# Patient Record
Sex: MALE | Race: WHITE | Employment: OTHER | URBAN - METROPOLITAN AREA
[De-identification: names, ages, dates, MRNs, and addresses within clinical notes are randomized per-mention and may not be internally consistent; named-entity substitution may affect disease eponyms.]

---

## 2022-11-19 ENCOUNTER — HOSPITAL ENCOUNTER (EMERGENCY)
Age: 72
Discharge: ANOTHER HOSPITAL | End: 2022-11-20
Attending: EMERGENCY MEDICINE
Payer: OTHER GOVERNMENT

## 2022-11-19 DIAGNOSIS — R00.0 TACHYCARDIA: ICD-10-CM

## 2022-11-19 DIAGNOSIS — I77.6 VASCULITIS (HCC): Primary | ICD-10-CM

## 2022-11-19 DIAGNOSIS — R09.02 HYPOXIA: ICD-10-CM

## 2022-11-19 DIAGNOSIS — R31.9 HEMATURIA, UNSPECIFIED TYPE: ICD-10-CM

## 2022-11-19 LAB
ALBUMIN SERPL-MCNC: 3.5 GM/DL (ref 3.4–5)
ALP BLD-CCNC: 59 IU/L (ref 40–129)
ALT SERPL-CCNC: 18 U/L (ref 10–40)
ANION GAP SERPL CALCULATED.3IONS-SCNC: 13 MMOL/L (ref 4–16)
APTT: 32.9 SECONDS (ref 25.1–37.1)
AST SERPL-CCNC: 12 IU/L (ref 15–37)
BACTERIA: NEGATIVE /HPF
BASOPHILS ABSOLUTE: 0 K/CU MM
BASOPHILS RELATIVE PERCENT: 0.2 % (ref 0–1)
BILIRUB SERPL-MCNC: 0.5 MG/DL (ref 0–1)
BILIRUBIN URINE: NEGATIVE MG/DL
BLOOD, URINE: ABNORMAL
BUN BLDV-MCNC: 17 MG/DL (ref 6–23)
CALCIUM SERPL-MCNC: 9.4 MG/DL (ref 8.3–10.6)
CAST TYPE: ABNORMAL /HPF
CHLORIDE BLD-SCNC: 95 MMOL/L (ref 99–110)
CLARITY: CLEAR
CO2: 24 MMOL/L (ref 21–32)
COLOR: YELLOW
CREAT SERPL-MCNC: 0.5 MG/DL (ref 0.9–1.3)
CRYSTAL TYPE: NEGATIVE /HPF
DIFFERENTIAL TYPE: ABNORMAL
EOSINOPHILS ABSOLUTE: 0 K/CU MM
EOSINOPHILS RELATIVE PERCENT: 0.3 % (ref 0–3)
EPITHELIAL CELLS, UA: 1 /HPF
GFR SERPL CREATININE-BSD FRML MDRD: >60 ML/MIN/1.73M2
GLUCOSE BLD-MCNC: 243 MG/DL (ref 70–99)
GLUCOSE, URINE: >1000 MG/DL
HCT VFR BLD CALC: 56.9 % (ref 42–52)
HEMOGLOBIN: 18.9 GM/DL (ref 13.5–18)
IMMATURE NEUTROPHIL %: 0.8 % (ref 0–0.43)
INR BLD: 1.13 INDEX
KETONES, URINE: NEGATIVE MG/DL
LEUKOCYTE ESTERASE, URINE: NEGATIVE
LYMPHOCYTES ABSOLUTE: 1.2 K/CU MM
LYMPHOCYTES RELATIVE PERCENT: 14 % (ref 24–44)
MCH RBC QN AUTO: 29.6 PG (ref 27–31)
MCHC RBC AUTO-ENTMCNC: 33.2 % (ref 32–36)
MCV RBC AUTO: 89 FL (ref 78–100)
MONOCYTES ABSOLUTE: 0.8 K/CU MM
MONOCYTES RELATIVE PERCENT: 9.1 % (ref 0–4)
NITRITE URINE, QUANTITATIVE: NEGATIVE
PDW BLD-RTO: 14 % (ref 11.7–14.9)
PH, URINE: 6 (ref 5–8)
PLATELET # BLD: 245 K/CU MM (ref 140–440)
PMV BLD AUTO: 9.4 FL (ref 7.5–11.1)
POTASSIUM SERPL-SCNC: 4.2 MMOL/L (ref 3.5–5.1)
PROTEIN UA: ABNORMAL MG/DL
PROTHROMBIN TIME: 14.2 SECONDS (ref 11.7–14.5)
RBC # BLD: 6.39 M/CU MM (ref 4.6–6.2)
RBC URINE: 5 /HPF (ref 0–3)
SEGMENTED NEUTROPHILS ABSOLUTE COUNT: 6.6 K/CU MM
SEGMENTED NEUTROPHILS RELATIVE PERCENT: 75.6 % (ref 36–66)
SODIUM BLD-SCNC: 132 MMOL/L (ref 135–145)
SPECIFIC GRAVITY UA: 1.01 (ref 1–1.03)
TOTAL IMMATURE NEUTOROPHIL: 0.07 K/CU MM
TOTAL PROTEIN: 7.5 GM/DL (ref 6.4–8.2)
UROBILINOGEN, URINE: 0.2 MG/DL (ref 0.2–1)
WBC # BLD: 8.7 K/CU MM (ref 4–10.5)
WBC UA: 1 /HPF (ref 0–2)

## 2022-11-19 PROCEDURE — 85025 COMPLETE CBC W/AUTO DIFF WBC: CPT

## 2022-11-19 PROCEDURE — 80053 COMPREHEN METABOLIC PANEL: CPT

## 2022-11-19 PROCEDURE — 87635 SARS-COV-2 COVID-19 AMP PRB: CPT

## 2022-11-19 PROCEDURE — 81001 URINALYSIS AUTO W/SCOPE: CPT

## 2022-11-19 PROCEDURE — 85730 THROMBOPLASTIN TIME PARTIAL: CPT

## 2022-11-19 PROCEDURE — 85610 PROTHROMBIN TIME: CPT

## 2022-11-19 PROCEDURE — 99285 EMERGENCY DEPT VISIT HI MDM: CPT

## 2022-11-19 PROCEDURE — 96360 HYDRATION IV INFUSION INIT: CPT

## 2022-11-19 RX ORDER — GLYBURIDE 5 MG/1
TABLET ORAL
COMMUNITY
Start: 2022-11-09

## 2022-11-19 RX ORDER — DILTIAZEM HYDROCHLORIDE 180 MG/1
CAPSULE, EXTENDED RELEASE ORAL
COMMUNITY
Start: 2022-09-09

## 2022-11-19 RX ORDER — LOSARTAN POTASSIUM 100 MG/1
TABLET ORAL
COMMUNITY
Start: 2022-09-09

## 2022-11-19 RX ORDER — ATORVASTATIN CALCIUM 40 MG/1
TABLET, FILM COATED ORAL
COMMUNITY
Start: 2022-09-09

## 2022-11-19 RX ORDER — SERTRALINE HYDROCHLORIDE 100 MG/1
TABLET, FILM COATED ORAL
COMMUNITY
Start: 2022-09-09

## 2022-11-19 RX ORDER — AMITRIPTYLINE HYDROCHLORIDE 50 MG/1
TABLET, FILM COATED ORAL
COMMUNITY
Start: 2019-02-21

## 2022-11-19 RX ORDER — ASPIRIN 81 MG/1
TABLET ORAL
COMMUNITY
Start: 2018-07-12

## 2022-11-19 RX ORDER — ALBUTEROL SULFATE 90 UG/1
1 AEROSOL, METERED RESPIRATORY (INHALATION) EVERY 6 HOURS PRN
COMMUNITY
Start: 2016-02-20

## 2022-11-19 ASSESSMENT — PAIN - FUNCTIONAL ASSESSMENT: PAIN_FUNCTIONAL_ASSESSMENT: NONE - DENIES PAIN

## 2022-11-20 VITALS
WEIGHT: 255 LBS | SYSTOLIC BLOOD PRESSURE: 143 MMHG | DIASTOLIC BLOOD PRESSURE: 91 MMHG | TEMPERATURE: 98 F | OXYGEN SATURATION: 95 % | RESPIRATION RATE: 16 BRPM | HEART RATE: 102 BPM

## 2022-11-20 LAB
SARS-COV-2, NAAT: NOT DETECTED
SOURCE: NORMAL

## 2022-11-20 PROCEDURE — 6370000000 HC RX 637 (ALT 250 FOR IP): Performed by: EMERGENCY MEDICINE

## 2022-11-20 PROCEDURE — 2580000003 HC RX 258: Performed by: EMERGENCY MEDICINE

## 2022-11-20 RX ORDER — PREDNISONE 20 MG/1
60 TABLET ORAL ONCE
Status: COMPLETED | OUTPATIENT
Start: 2022-11-20 | End: 2022-11-20

## 2022-11-20 RX ORDER — 0.9 % SODIUM CHLORIDE 0.9 %
500 INTRAVENOUS SOLUTION INTRAVENOUS ONCE
Status: COMPLETED | OUTPATIENT
Start: 2022-11-20 | End: 2022-11-20

## 2022-11-20 RX ADMIN — PREDNISONE 60 MG: 20 TABLET ORAL at 00:10

## 2022-11-20 RX ADMIN — SODIUM CHLORIDE 500 ML: 9 INJECTION, SOLUTION INTRAVENOUS at 00:10

## 2022-11-20 NOTE — ED PROVIDER NOTES
EMERGENCY DEPARTMENT ENCOUNTER      CHIEF COMPLAINT:   Bruising to feet and legs  Rash    HPI: Gonzalo Mendoza is a 67 y.o. male who presents to the Emergency Department complaining of bruising to his feet and legs associated with swelling of his feet and ankles and red spots extending up his legs. The patient states his symptoms started 2 days ago. It is rapidly progressed. He states that today his feet are almost entirely bruised and he now has dark red spots extending up his legs. He has never had anything like this before. He does not take blood thinners. He denies any trauma. He denies any associated pain. He denies leonor hematuria. Symptoms are constant and progressing. . There are no exacerbating or relieving factors. The patient denies a history of DVT or PE. The patient denies fevers, chills, chest pain, shortness of breath, abdominal pain, numbness, tingling, weakness, or any other complaints. REVIEW OF SYSTEMS:  CONSTITUTIONAL:  Denies fever, chills, weight loss or weakness  EYES:  Denies photophobia or discharge  ENT:  Denies sore throat or ear pain  CARDIOVASCULAR:  Denies chest pain, palpitations or swelling  RESPIRATORY:  Denies cough or shortness of breath  GI: Denies abdominal pain, nausea, vomiting, or diarrhea  MUSCULOSKELETAL: See HPI  SKIN:  No rash see HPI  NEUROLOGIC:  Denies headache, focal weakness or sensory changes  All systems negative except as marked. \"Remaining review of systems reviewed and negative. I have reviewed the nursing triage documentation and agree unless otherwise noted below. \"    PAST MEDICAL HISTORY:   Past Medical History:   Diagnosis Date    Anxiety     Cancer ([facility] [address]     Diabetes mellitus ([facility] [address]     Hyperlipidemia     Hypertension        CURRENT MEDICATIONS:   Home medications reviewed.     SURGICAL HISTORY:   Past Surgical History:   Procedure Laterality Date    CATARACT REMOVAL Bilateral     NOSE SURGERY      x2, nose removed with second surgery    SKIN CANCER EXCISION         FAMILY HISTORY:   History reviewed. No pertinent family history. SOCIAL HISTORY:   Social History     Socioeconomic History    Marital status:      Spouse name: Not on file    Number of children: Not on file    Years of education: Not on file    Highest education level: Not on file   Occupational History    Not on file   Tobacco Use    Smoking status: Every Day     Packs/day: 2.00     Types: Cigarettes    Smokeless tobacco: Never   Vaping Use    Vaping Use: Never used   Substance and Sexual Activity    Alcohol use: Not Currently    Drug use: Never    Sexual activity: Not on file   Other Topics Concern    Not on file   Social History Narrative    Not on file     Social Determinants of Health     Financial Resource Strain: Not on file   Food Insecurity: Not on file   Transportation Needs: Not on file   Physical Activity: Not on file   Stress: Not on file   Social Connections: Not on file   Intimate Partner Violence: Not on file   Housing Stability: Not on file       ALLERGIES: Captopril    PHYSICAL EXAM:  VITAL SIGNS:   ED Triage Vitals [11/19/22 2120]   Enc Vitals Group      /81      Heart Rate (!) 118      Resp 20      Temp 98 °F (36.7 °C)      Temp Source Infrared      SpO2 95 %      Weight 255 lb (115.7 kg)      Height       Head Circumference       Peak Flow       Pain Score       Pain Loc       Pain Edu? Excl. in [address]? Constitutional:  Non-toxic appearance  HENT: Normocephalic, Atraumatic  Eyes: PERRL, conjunctiva normal   Neck: Normal range of motion, No tenderness, Supple, No stridor, No lymphadenopathy  Cardiovascular:  Normal heart rate, Normal rhythm  Pulmonary/Chest:  Normal breath sounds, No respiratory distress, No wheezing  Abdomen: Bowel sounds normal, Soft, No tenderness, No masses, No pulsatile masses  Extremities:  There is dark discoloration of the dorsum of bilateral feet with bruising and bleeding under the skin, there are no open wounds or bleeding externally, these are not blanchable, there are purpura measuring approximately 0.25 to 0.5 cm in diameter extending up both legs, there are purpura noted to both forearms and hands, feet are well perfused, no cyanosis the peripheral pulses are strong, Capillary refill is brisk, there is normal motor and sensory function, the distal extremities are well perfused, there is no cyanosis  Skin:  Warm, Dry,see extremity exam      EKG:    None    Radiology / Procedures:  Labs Reviewed   CBC WITH AUTO DIFFERENTIAL - Abnormal; Notable for the following components:       Result Value    RBC 6.39 (*)     Hemoglobin 18.9 (*)     Hematocrit 56.9 (*)     Segs Relative 75.6 (*)     Lymphocytes % 14.0 (*)     Monocytes % 9.1 (*)     Immature Neutrophil % 0.8 (*)     All other components within normal limits   COMPREHENSIVE METABOLIC PANEL - Abnormal; Notable for the following components:    Sodium 132 (*)     Chloride 95 (*)     Creatinine 0.5 (*)     Glucose 243 (*)     AST 12 (*)     All other components within normal limits   URINALYSIS - Abnormal; Notable for the following components:    Glucose, Urine >1,000 (*)     Blood, Urine MODERATE (*)     Protein, UA TRACE (*)     All other components within normal limits   MICROSCOPIC URINALYSIS - Abnormal; Notable for the following components:    RBC, UA 5 (*)     All other components within normal limits   COVID-19, RAPID   PROTIME/INR & PTT     ED COURSE & MEDICAL DECISION MAKING:  Pertinent Labs & Imaging studies reviewed. (See chart for details)  On exam, the patient is afebrile and nontoxic appearing. He is tachycardic to the 1 teens. He is otherwise hemodynamically stable and neurologically intact. Extremity exam shows significant vasculitis as described above. Labs are obtained and hemoglobin is elevated at 18.9. There is a normal white blood cell count with a left shift. There is no bandemia. Platelet count is normal.  He has mild hyponatremia.   He has mild hematuria with no evidence of infection. INR is normal at 1.13. I think the patient would benefit from testing with a CRP, ESR and immune markers. These labs are a send out here and I am unable to get them back in a reasonable amount of time. I suspect that the patient has vasculitis with hematuria for which I am concerned there may be kidney involvement. I have a low suspicion for DVT, acute fracture, dislocation, compartment syndrome, necrotizing fasciitis, or neurological deficit. I recommended admission to the hospital and the patient was agreeable. We are a freestanding emergency department and the patient will need to be transferred. He prefers transfer to the [facility]. I have discussed the case with [facility] to help arrange [facility]. [facility] and [facility] for me to fill out and send back. This is in process at this time. 12:30 am  Packet faxed to [facility]    12:35 am  The patient has become slightly hypoxic on room air at 88%. He is placed on 2 L of oxygen by nasal cannula with improvement. 2:00 am  The patient has been accepted for admission to the [facility] by Dr. Vinay Cartwright. Support will be here at 3:30 AM.    Clinical Impression:  1. Vasculitis ([address]    2. Hematuria, unspecified type    3. Tachycardia        Comment: Please note this report has been produced using speech recognition software and may contain errors related to that system including errors in grammar, punctuation, and spelling, as well as words and phrases that may be inappropriate. If there are any questions or concerns please feel free to contact the dictating provider for clarification.         Isabel Bañuelos MD  11/20/22 2078

## 2022-11-20 NOTE — ED NOTES
Patient oxygen saturation remaining 88-89% on RA. Placed on 2L NC at this time and notified provider. Oxygen saturation improves to 94%.       Ludy Bhatia RN  11/20/22 1864